# Patient Record
(demographics unavailable — no encounter records)

---

## 2024-10-09 NOTE — HISTORY OF PRESENT ILLNESS
[de-identified] : 9 d/o female referred for possible tongue tie Referred by lactation specialist. Presents with poor breast feeding Requires supplementation with formula.  States doing better on formula Slow to gain weight.  States having a hard time latching on and it hurts mom to breast feed.   This has been going on since birth but there is no associated cyanosis or snoring.   Having wet diapers .    States was born 38 weeks.  No history of intubation.   States passed  hearing test.

## 2024-10-09 NOTE — REVIEW OF SYSTEMS
[TextEntry] :           A complete review of >10 systems was performed and all systems were negative except as indicated on the HPI/PMH/PSH.

## 2024-10-09 NOTE — PHYSICAL EXAM
[Exposed Vessel] : left anterior vessel not exposed [Clear to Auscultation] : lungs were clear to auscultation bilaterally [Wheezing] : no wheezing [Increased Work of Breathing] : no increased work of breathing with use of accessory muscles and retractions [Normal Gait and Station] : normal gait and station [Normal muscle strength, symmetry and tone of facial, head and neck musculature] : normal muscle strength, symmetry and tone of facial, head and neck musculature [Normal] : no cervical lymphadenopathy [de-identified] : ankyloglossia

## 2024-10-09 NOTE — SOCIAL HISTORY
[TextEntry] :                             The child lives at home with parents.  No smoke exposure